# Patient Record
(demographics unavailable — no encounter records)

---

## 2017-11-22 NOTE — EDM.PDOC
ED HPI GENERAL MEDICAL PROBLEM





- General


Chief Complaint: Lower Extremity Injury/Pain


Stated Complaint: GLASS IN FOOT??? [phone]


Time Seen by Provider: 11/22/17 19:20


Source of Information: Reports: Family


History Limitations: Reports: No Limitations





- History of Present Illness


INITIAL COMMENTS - FREE TEXT/NARRATIVE: 





complaining of pain to right foot, mom questions if stepped on glass or piece 

from [employer] game case that older son had smashed as she continues to find small 

pieces of case on floor. 


Onset: Today


Location: Reports: Lower Extremity, Right





- Related Data


 Allergies











Allergy/AdvReac Type Severity Reaction Status Date / Time


 


No Known Allergies Allergy   Verified 11/22/17 19:58











Home Meds: 


 Home Meds





. [No Known Home Meds]  07/06/16 [History]











Past Medical History





- Past Health History


Medical/Surgical History: Denies Medical/Surgical History





Social & Family History





- Family History


Family Medical History: Noncontributory





- Tobacco Use


Smoking Status *Q: Never Smoker


Second Hand Smoke Exposure: No





- Caffeine Use


Caffeine Use: Reports: None





- Recreational Drug Use


Recreational Drug Use: No





- Living Situation & Occupation


Living situation: Reports: with Family





Review of Systems





- Review of Systems


Review Of Systems: ROS reveals no pertinent complaints other than HPI.





ED EXAM, GENERAL





- Physical Exam


Exam: See Below


Exam Limited By: No Limitations


General Appearance: Alert, Mild Distress (with palpation of mid heel)


Ears: Normal External Exam


Throat/Mouth: Normal Voice, No Airway Compromise


Head: Atraumatic, Normocephalic


Neck: Full Range of Motion


Respiratory/Chest: No Respiratory Distress


Cardiovascular: Normal Peripheral Pulses


Extremities: No: Redness


Neurological: Alert, Normal Cognition


Psychiatric: Normal Affect


Skin Exam: Warm, Dry, Other (2mm superficial cut to heel with slight tenderness 

with palpation rough center no redness or swelling to area)





ED TRAUMA EXTREMITY PROCEDURES





- Foreign Body Removal


Anesthesia Type: Local


Complications:: No


Comments:: 





LET to wound area. Cleansed with Betadine, Small clear sliver removed with 18 

gu needle. Bandaid dressing. Child tolerated well.





Course





- Vital Signs


Last Recorded V/S: 


 Last Vital Signs











Temp  98.4 F   11/22/17 18:43


 


Pulse  118   11/22/17 18:43


 


Resp  28   11/22/17 18:43


 


BP      


 


Pulse Ox  99   11/22/17 18:43














- Orders/Labs/Meds


Meds: 


Medications














Discontinued Medications














Generic Name Dose Route Start Last Admin





  Trade Name Bianca  PRN Reason Stop Dose Admin


 


Lidocaine/Tetracaine  5 ml  11/22/17 18:54  11/22/17 19:20





  Let Soln  TOP  11/22/17 18:55  5 ml





  ONETIME ONE   Administration














Departure





- Departure


Time of Disposition: 19:59


Disposition: Home, Self-Care 01


Condition: Good


Clinical Impression: 


 Foreign body (FB) in soft tissue








- Discharge Information


Instructions:  Sliver Removal, Care After


Referrals: 


PCP,None [Ordering Only Provider] - 


Additional Instructions: 


blayne foot in bath or warm water twice daily keep clean


wear shoes until sure plastic slivers cleaned up


follow up if redness increase pain, discharge